# Patient Record
Sex: MALE | Race: BLACK OR AFRICAN AMERICAN | NOT HISPANIC OR LATINO | URBAN - METROPOLITAN AREA
[De-identification: names, ages, dates, MRNs, and addresses within clinical notes are randomized per-mention and may not be internally consistent; named-entity substitution may affect disease eponyms.]

---

## 2023-03-17 ENCOUNTER — EMERGENCY (EMERGENCY)
Facility: HOSPITAL | Age: 28
LOS: 1 days | Discharge: HOME | End: 2023-03-17
Attending: EMERGENCY MEDICINE
Payer: MEDICAID

## 2023-03-17 VITALS
TEMPERATURE: 99 F | OXYGEN SATURATION: 99 % | DIASTOLIC BLOOD PRESSURE: 95 MMHG | SYSTOLIC BLOOD PRESSURE: 150 MMHG | HEART RATE: 88 BPM | RESPIRATION RATE: 20 BRPM

## 2023-03-17 VITALS
OXYGEN SATURATION: 95 % | HEART RATE: 92 BPM | RESPIRATION RATE: 20 BRPM | TEMPERATURE: 98 F | WEIGHT: 192.02 LBS | HEIGHT: 72 IN | DIASTOLIC BLOOD PRESSURE: 98 MMHG | SYSTOLIC BLOOD PRESSURE: 152 MMHG

## 2023-03-17 LAB
ALBUMIN SERPL ELPH-MCNC: 4.9 G/DL — SIGNIFICANT CHANGE UP (ref 3.3–5)
ALP SERPL-CCNC: 157 U/L — HIGH (ref 40–120)
ALT FLD-CCNC: 18 U/L — SIGNIFICANT CHANGE UP (ref 10–45)
ANION GAP SERPL CALC-SCNC: 11 MMOL/L — SIGNIFICANT CHANGE UP (ref 5–17)
AST SERPL-CCNC: 19 U/L — SIGNIFICANT CHANGE UP (ref 10–40)
BILIRUB SERPL-MCNC: 0.5 MG/DL — SIGNIFICANT CHANGE UP (ref 0.2–1.2)
BUN SERPL-MCNC: 8 MG/DL — SIGNIFICANT CHANGE UP (ref 7–23)
CALCIUM SERPL-MCNC: 9.8 MG/DL — SIGNIFICANT CHANGE UP (ref 8.4–10.5)
CHLORIDE SERPL-SCNC: 99 MMOL/L — SIGNIFICANT CHANGE UP (ref 96–108)
CO2 SERPL-SCNC: 29 MMOL/L — SIGNIFICANT CHANGE UP (ref 22–31)
CREAT SERPL-MCNC: 0.85 MG/DL — SIGNIFICANT CHANGE UP (ref 0.5–1.3)
EGFR: 121 ML/MIN/1.73M2 — SIGNIFICANT CHANGE UP
GLUCOSE SERPL-MCNC: 136 MG/DL — HIGH (ref 70–99)
HCT VFR BLD CALC: 50.9 % — HIGH (ref 39–50)
HGB BLD-MCNC: 17.1 G/DL — HIGH (ref 13–17)
MAGNESIUM SERPL-MCNC: 2.1 MG/DL — SIGNIFICANT CHANGE UP (ref 1.6–2.6)
MCHC RBC-ENTMCNC: 28.6 PG — SIGNIFICANT CHANGE UP (ref 27–34)
MCHC RBC-ENTMCNC: 33.6 GM/DL — SIGNIFICANT CHANGE UP (ref 32–36)
MCV RBC AUTO: 85.3 FL — SIGNIFICANT CHANGE UP (ref 80–100)
NRBC # BLD: 0 /100 WBCS — SIGNIFICANT CHANGE UP (ref 0–0)
PHOSPHATE SERPL-MCNC: 2.6 MG/DL — SIGNIFICANT CHANGE UP (ref 2.5–4.5)
PLATELET # BLD AUTO: 237 K/UL — SIGNIFICANT CHANGE UP (ref 150–400)
POTASSIUM SERPL-MCNC: 4.1 MMOL/L — SIGNIFICANT CHANGE UP (ref 3.5–5.3)
POTASSIUM SERPL-SCNC: 4.1 MMOL/L — SIGNIFICANT CHANGE UP (ref 3.5–5.3)
PROT SERPL-MCNC: 7.7 G/DL — SIGNIFICANT CHANGE UP (ref 6–8.3)
RBC # BLD: 5.97 M/UL — HIGH (ref 4.2–5.8)
RBC # FLD: 11.9 % — SIGNIFICANT CHANGE UP (ref 10.3–14.5)
SODIUM SERPL-SCNC: 139 MMOL/L — SIGNIFICANT CHANGE UP (ref 135–145)
WBC # BLD: 4.3 K/UL — SIGNIFICANT CHANGE UP (ref 3.8–10.5)
WBC # FLD AUTO: 4.3 K/UL — SIGNIFICANT CHANGE UP (ref 3.8–10.5)

## 2023-03-17 PROCEDURE — 99284 EMERGENCY DEPT VISIT MOD MDM: CPT | Mod: 25

## 2023-03-17 PROCEDURE — 83735 ASSAY OF MAGNESIUM: CPT

## 2023-03-17 PROCEDURE — 84100 ASSAY OF PHOSPHORUS: CPT

## 2023-03-17 PROCEDURE — 99284 EMERGENCY DEPT VISIT MOD MDM: CPT

## 2023-03-17 PROCEDURE — 85027 COMPLETE CBC AUTOMATED: CPT

## 2023-03-17 PROCEDURE — 36415 COLL VENOUS BLD VENIPUNCTURE: CPT

## 2023-03-17 PROCEDURE — 80053 COMPREHEN METABOLIC PANEL: CPT

## 2023-03-17 PROCEDURE — 93005 ELECTROCARDIOGRAM TRACING: CPT

## 2023-03-17 NOTE — ED PROVIDER NOTE - NSFOLLOWUPINSTRUCTIONS_ED_ALL_ED_FT
Syncope    Syncope refers to a condition in which a person temporarily loses consciousness. Syncope may also be called fainting or passing out. It is caused by a sudden decrease in blood flow to the brain. Even though most causes of syncope are not dangerous, syncope can be a sign of a serious medical problem. Your health care provider may do tests to find the reason why you are having syncope.    Signs that you may be about to faint include:  Feeling dizzy or light-headed.Feeling nauseous. Seeing all white or all black in your field of vision. Having cold, clammy skin.   If you faint, get medical help right away. Call your local emergency services (531 in the [place] Do not drive yourself to the hospital.    Follow these instructions at home:  Pay attention to any changes in your symptoms.   Take these actions to stay safe and to help relieve your symptoms:  Do not drive, use machinery, or play sports until your health care provider says it is okay. Do not drink alcohol. Do not use any products that contain nicotine or tobacco, such as cigarettes and e-cigarettes. If you need help quitting, ask your health care provider. Drink enough fluid to keep your urine pale yellow.    General instructions   Take over-the-counter and prescription medicines only as told by your health care provider. If you are taking blood pressure or heart medicine, get up slowly and take several minutes to sit and then stand. This can reduce dizziness or light-headedness. Have someone stay with you until you feel stable. If you start to feel like you might faint, lie down right away and raise (elevate) your feet above the level of your heart. Breathe deeply and steadily. Wait until all the symptoms have passed. Keep all follow-up visits as told by your health care provider. This is important.     Get help right away if you:  Have a severe headache. Faint once or repeatedly. Have pain in your chest, abdomen, or back. Have a very fast or irregular heartbeat (palpitations). Have pain when you breathe. Are bleeding from your mouth or rectum, or you have black or tarry stool. Have a seizure. Are confused. Have trouble walking. Have severe weakness. Have vision problems. These symptoms may represent a serious problem that is an emergency. Do not wait to see if your symptoms will go away. Get medical help right away. Call your local emergency services (911 in the [place] Do not drive yourself to the hospital.     Summary  Syncope refers to a condition in which a person temporarily loses consciousness. It is caused by a sudden decrease in blood flow to the brain. Signs that you may be about to faint include dizziness, feeling light-headed, feeling nauseous, sudden vision changes, or cold, clammy skin. Although most causes of syncope are not dangerous, syncope can be a sign of a serious medical problem. If you faint, get medical help right away. This information is not intended to replace advice given to you by your health care provider. Make sure you discuss any questions you have with your health care provider.

## 2023-03-17 NOTE — ED PROVIDER NOTE - CLINICAL SUMMARY MEDICAL DECISION MAKING FREE TEXT BOX
27-year-old male with no past medical history presenting with syncopal episode, post urination, no infectious symptoms, hemodynamically stable, no chest pain, EKG shows no interval changes or signs of ischemia/HOCM. Will obtain basic labs for low suspicion for anemia vs. metabolic derangement, monitor on tele, and continue to reassess 27-year-old male with no past medical history presenting with syncopal episode, post urination, no infectious symptoms, hemodynamically stable, no chest pain, EKG shows no interval changes or signs of ischemia/HOCM. Will obtain basic labs for low suspicion for anemia vs. metabolic derangement, monitor on tele, and continue to reassess  Garrett: syncopal episode last night after urination. no other sx. will eval for dangerous causes of syncope. no head trauma, no head pain now. 27-year-old male with no past medical history presenting with syncopal episode, post urination, no infectious symptoms, hemodynamically stable, no chest pain, EKG shows no interval changes or signs of ischemia/HOCM (but isolated P wave change in lead II: P pulmonale) . Will obtain basic labs for low suspicion for anemia vs. metabolic derangement, monitor on tele, and continue to reassess  Garrett: syncopal episode last night after urination. no other sx. will eval for dangerous causes of syncope. no head trauma, no head pain now.

## 2023-03-17 NOTE — ED PROVIDER NOTE - PHYSICAL EXAMINATION
Gen: WDWN, NAD, comfortable appearing, afebrile, hemodynamically stable   HEENT: Atraumatic head, PERRLA, EOMI, no nasal discharge, mucous membranes moist, no oropharyngeal edema/erythema/exudates, small inner up lip superficial abrasion with no active bleeding   CV: RRR, +S1/S2, no M/R/G, equal b/l radial pulses 2+  Resp: CTAB, no W/R/R, SPO2 >95% on RA, no increased WOB   GI: Abdomen soft non-distended, NTTP, no masses/organomegaly   MSK/Skin: Mild left sided paraspinal cervical TTP with no midline TTP, no CVA tenderness, no open wounds, no bruising, no LE edema. Small right knee abrasion   Neuro: CN2-12 grossly intact, A&Ox4, MS +5/5 in UE and LE BL, gross sensation intact in UE and LE BL  Psych: appropriate mood

## 2023-03-17 NOTE — ED PROVIDER NOTE - NSFOLLOWUPCLINICS_GEN_ALL_ED_FT
[facility]  Cardiology  [address] [place] [id number]  Phone: [phone]  Fax:

## 2023-03-17 NOTE — ED PROVIDER NOTE - PROGRESS NOTE DETAILS
Jay, PGY-3  Labs non-actionable. Hgb mildly elevated; possibly component of dehydration. EKG non-ischemic or interval changes but given P wave change in lead II (P pulmonale) will dc with cards f/u. Unlikely related to syncopal episode. Will encourage PO intake and d/c with return precautions Jay, PGY-3  Labs non-actionable. Hgb mildly elevated; possibly component of dehydration; on reval patient reports not drinking enough water in past couple of days and aware he needs to increase intake. EKG non-ischemic or interval changes but given P wave change in lead II (P pulmonale; no hx of lung disease) will dc with cards f/u. Unlikely related to syncopal episode. Will encourage PO intake and d/c with return precautions

## 2023-03-17 NOTE — ED ADULT TRIAGE NOTE - CHIEF COMPLAINT QUOTE
Syncopal episode last night. "I was peeing and next thing I knew I woke up on the ground." Today c/o of left sided neck pain, right knee pain.

## 2023-03-17 NOTE — ED ADULT NURSE NOTE - OBJECTIVE STATEMENT
28YO male with no significant PMH via walk in presenting with complaints of syncope. Pt states going to use the bathroom to pee and waking up on the floor. Pt is unaware of how he ended up on the floor and if he hit his head. Pt called brother when he woke up @ approx. 7:20a. pt c/o left neck pain. Pt Axox4, gross neuro intact, PERRL 4mm. respirations even, & non-labored. NSR on cardiac monitor lead 2, radial pulses strong and equal bilaterally.  Skin warm, dry, and intact. Pt denies chest pain, palpitations, shortness of breath, headache, visual disturbances, numbness/tingling, fever, chills, diaphoresis,  nausea, or vomiting. placed in position of comfort. Sister in law at bedside. Bed in lowest position, wheels locked, appropriate side rails raised. Pt denies needs at this time. 28YO male with no significant PMH via walk in presenting with complaints of syncope. Pt states going to use the bathroom to pee and waking up on the floor. Pt is unaware of how he ended up on the floor and if he hit his head. Pt called brother when he woke up @ approx. 7:20a. pt c/o left neck pain. Pt Axox4, gross neuro intact, PERRL 4mm. UE & LE strength is 5/5, respirations even, & non-labored. NSR on cardiac monitor lead 2, radial pulses strong and equal bilaterally.  Skin warm, dry, and intact. Pt denies chest pain, palpitations, shortness of breath, headache, visual disturbances, numbness/tingling, fever, chills, diaphoresis,  nausea, or vomiting. placed in position of comfort. Sister in law at bedside. Bed in lowest position, wheels locked, appropriate side rails raised. Pt denies needs at this time.

## 2023-03-17 NOTE — ED PROVIDER NOTE - OBJECTIVE STATEMENT
27-year-old male with no past medical history presenting with syncopal episode.  Patient reports going to the bathroom, urinating, shortly after remembers waking up on bathroom floor but does not think he hit his head, not on anticoagulation.  Reports small right knee abrasion, left sided neck/shoulder pain, and abrasion to inner upper lip.  Denies any changes in vision/hearing, headache, focal weakness, numbness/tingling, chest pain, shortness of breath, recent fever/chills, nausea/vomiting/diarrhea, cough, rashes, or changes in urination.

## 2023-03-17 NOTE — ED PROVIDER NOTE - ATTENDING CONTRIBUTION TO CARE
I performed a history and physical exam of the patient and discussed their management with the resident and /or advanced care provider. I reviewed the resident and /or ACP's note and agree with the documented findings and plan of care. My medical decision making and observations are found above.  No eddie neck pain, lungs clear heart RRR

## 2023-03-17 NOTE — ED PROVIDER NOTE - PATIENT PORTAL LINK FT
You can access the FollowMyHealth Patient Portal offered by [facility] by registering at the following website: [email or web address]. By joining [employer]’s FollowMyHealth portal, you will also be able to view your health information using other applications (apps) compatible with our system.

## 2023-03-17 NOTE — ED ADULT TRIAGE NOTE - BSA (M2)
Implemented All Universal Safety Interventions:  [place] to call system. Call bell, personal items and telephone within reach. Instruct patient to call for assistance. Room bathroom lighting operational. Non-slip footwear when patient is off stretcher. Physically safe environment: no spills, clutter or unnecessary equipment. Stretcher in lowest position, wheels locked, appropriate side rails in place. 2.09

## 2024-05-03 ENCOUNTER — EMERGENCY (EMERGENCY)
Facility: HOSPITAL | Age: 29
LOS: 0 days | Discharge: HOME | End: 2024-05-04
Attending: EMERGENCY MEDICINE
Payer: COMMERCIAL

## 2024-05-03 VITALS
HEART RATE: 77 BPM | RESPIRATION RATE: 18 BRPM | SYSTOLIC BLOOD PRESSURE: 167 MMHG | OXYGEN SATURATION: 98 % | DIASTOLIC BLOOD PRESSURE: 98 MMHG | WEIGHT: 179.9 LBS | TEMPERATURE: 98 F
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
## 2024-05-03 DIAGNOSIS — M54.50 LOW BACK PAIN, UNSPECIFIED: ICD-10-CM
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
## 2024-05-03 DIAGNOSIS — I10 ESSENTIAL (PRIMARY) HYPERTENSION: ICD-10-CM

## 2024-05-03 PROCEDURE — 99284 EMERGENCY DEPT VISIT MOD MDM: CPT

## 2024-05-03 PROCEDURE — 99053 MED SERV 10PM-8AM 24 HR FAC: CPT
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
## 2024-05-04 RX ORDER — IBUPROFEN 200 MG
1 TABLET ORAL
Qty: 20 | Refills: 0
Start: 2024-05-04 | End: 2024-05-08

## 2024-05-04 RX ORDER — METHOCARBAMOL 500 MG/1
1500 TABLET, FILM COATED ORAL ONCE
Refills: 0 | Status: COMPLETED | OUTPATIENT
Start: 2024-05-04 | End: 2024-05-04

## 2024-05-04 RX ORDER — METHOCARBAMOL 500 MG/1
1 TABLET, FILM COATED ORAL
Qty: 20 | Refills: 0
Start: 2024-05-04 | End: 2024-05-08

## 2024-05-04 RX ORDER — IBUPROFEN 200 MG
600 TABLET ORAL ONCE
Refills: 0 | Status: COMPLETED | OUTPATIENT
Start: 2024-05-04 | End: 2024-05-04

## 2024-05-04 RX ADMIN — METHOCARBAMOL 1500 MILLIGRAM(S): 500 TABLET, FILM COATED ORAL at 02:34

## 2024-05-04 RX ADMIN — Medication 600 MILLIGRAM(S): at 02:34
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
## 2024-05-04 NOTE — ED PROVIDER NOTE - PHYSICAL EXAMINATION
Vitals: HTN at 167/98, otherwise WNL  Gen: AAOx3, NAD, sitting comfortably in stretcher  Head: ncat, perrla, eomi b/l  Neck: supple, no lymphadenopathy, no midline deviation  Heart: rrr, no m/r/g  Lungs: CTA b/l, no rales/ronchi/wheezes  Abd: soft, nontender, non-distended, no rebound or guarding  Ext: no clubbing/cyanosis/edema  Neuro: sensation and muscle strength intact b/l, steady gait  musculo: R lumbar paraspinal muscle hypertonicity, no midline tenderness, no rash or broken skin
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
## 2024-05-04 NOTE — ED PROVIDER NOTE - PATIENT PORTAL LINK FT
You can access the FollowMyHealth Patient Portal offered by [facility] by registering at the following website: [email or web address]. By joining [employer]’s FollowMyHealth portal, you will also be able to view your health information using other applications (apps) compatible with our system.

## 2024-05-04 NOTE — ED PROVIDER NOTE - CARE PROVIDER_API CALL
Brendon Milligan  Orthopaedic Surgery  [address] [place] [address]  Phone: [phone]  Fax: [phone]  Follow Up Time: Urgent